# Patient Record
Sex: FEMALE | Race: WHITE | Employment: FULL TIME | URBAN - METROPOLITAN AREA
[De-identification: names, ages, dates, MRNs, and addresses within clinical notes are randomized per-mention and may not be internally consistent; named-entity substitution may affect disease eponyms.]

---

## 2019-04-29 PROBLEM — S42.022D: Status: ACTIVE | Noted: 2019-04-29

## 2019-06-13 PROBLEM — M85.89 OSTEOPENIA OF MULTIPLE SITES: Status: ACTIVE | Noted: 2017-03-22

## 2019-07-09 PROCEDURE — 88377 M/PHMTRC ALYS ISHQUANT/SEMIQ: CPT | Performed by: RADIOLOGY

## 2019-07-09 PROCEDURE — 88360 TUMOR IMMUNOHISTOCHEM/MANUAL: CPT | Performed by: RADIOLOGY

## 2019-07-09 PROCEDURE — 88305 TISSUE EXAM BY PATHOLOGIST: CPT | Performed by: RADIOLOGY

## 2019-07-15 PROBLEM — Z17.0 MALIGNANT NEOPLASM OF CENTRAL PORTION OF RIGHT BREAST IN FEMALE, ESTROGEN RECEPTOR POSITIVE (HCC): Status: ACTIVE | Noted: 2019-07-15

## 2019-07-15 PROBLEM — C50.111 MALIGNANT NEOPLASM OF CENTRAL PORTION OF RIGHT BREAST IN FEMALE, ESTROGEN RECEPTOR POSITIVE (HCC): Status: ACTIVE | Noted: 2019-07-15

## 2019-07-15 PROBLEM — Z17.0 MALIGNANT NEOPLASM OF CENTRAL PORTION OF RIGHT BREAST IN FEMALE, ESTROGEN RECEPTOR POSITIVE: Status: ACTIVE | Noted: 2019-07-15

## 2019-07-15 PROBLEM — C50.111 MALIGNANT NEOPLASM OF CENTRAL PORTION OF RIGHT BREAST IN FEMALE, ESTROGEN RECEPTOR POSITIVE: Status: ACTIVE | Noted: 2019-07-15

## 2019-07-31 ENCOUNTER — OFFICE VISIT (OUTPATIENT)
Dept: SURGERY | Facility: CLINIC | Age: 64
End: 2019-07-31
Payer: COMMERCIAL

## 2019-07-31 VITALS — BODY MASS INDEX: 22.91 KG/M2 | HEIGHT: 67 IN | WEIGHT: 146 LBS

## 2019-07-31 DIAGNOSIS — Z17.0 MALIGNANT NEOPLASM OF CENTRAL PORTION OF RIGHT BREAST IN FEMALE, ESTROGEN RECEPTOR POSITIVE (HCC): ICD-10-CM

## 2019-07-31 DIAGNOSIS — C50.111 MALIGNANT NEOPLASM OF CENTRAL PORTION OF RIGHT BREAST IN FEMALE, ESTROGEN RECEPTOR POSITIVE (HCC): ICD-10-CM

## 2019-07-31 DIAGNOSIS — Z01.818 PREOP EXAMINATION: Primary | ICD-10-CM

## 2019-07-31 PROCEDURE — 99243 OFF/OP CNSLTJ NEW/EST LOW 30: CPT | Performed by: SURGERY

## 2019-07-31 RX ORDER — BIOTIN 1 MG
1 TABLET ORAL DAILY
COMMUNITY

## 2019-07-31 NOTE — PROGRESS NOTES
Surgeon: Dr. Crystal Winters.  Marcello Martinez, PhD     Tel:  [phone]    Fax: [phone]     Surgery/Procedure: Immediate right breast reconstruction with tissue expander placement and acellular dermal matrix     1.5 hours, General anesthesia, observation stay       Ho

## 2019-07-31 NOTE — CONSULTS
New Patient Consultation    Chief Complaint:  Patient presents with:  Consult: Breast Cancer, Breast Reconstruction.  Dr. Rosalia Baires Referring      History of Present Illness:   Abram Jiménez is a 62year old female referred by Dr. Rosalia Baires to discuss Katelyn Mccoy Stroke Mother    • Cancer Brother         NMSC   • Heart Disorder Brother    • Cancer Other         breast, aunt   • Breast Cancer Maternal Aunt 61        age 62s   • Heart Disorder Sister    • Heart Disorder Brother    • Eye Problems Neg          Social H with swallowing, reflux symptoms, nausea, vomiting, dark/ bloody stools, diarrhea, constipation,  change in bowel habits, or abdominal pain.    Genitourinary:  The patient denies frequent urination, needing to get up at night to urinate, urinary hesitancy o nipple retraction, lumpectomy scar  Base Diameter 13cm. Respiratory: Normal respiratory effort. Cardiovascular: no cyanosis, no edema    Lymphatic:  There is no cervical, supraclavicular, or axillary lymphadenopathy appreciated.     Musculoskeletal: but not limited to bleeding, infection, scarring, seroma, delayed wound healing, partial/total flap loss, fat necrosis, asymmetry, injury to adjacent structures, abdominal hernia/weakness/bulge, need for further surgery, and venous thromboembolism  reviewe

## 2019-07-31 NOTE — PROGRESS NOTES
Pt given pre-op instructions for surgery. Surgery to be coordinated with Dr. David Sy. Surgical scrub wash and instructions provided. PT instructed to see PCP for medical clearance prior to surgery, along with obtain labs and EKG. Pt agreed and understood.

## 2019-08-06 ENCOUNTER — TELEPHONE (OUTPATIENT)
Dept: SURGERY | Facility: CLINIC | Age: 64
End: 2019-08-06

## 2019-08-06 NOTE — TELEPHONE ENCOUNTER
Patient calling to schedule surgery, joint case with Dr. Gracy Ventura-  Message routed to our surgery scheduler to contact Agus Carlton at Dr. Eliza Villar office.

## 2019-08-12 ENCOUNTER — TELEPHONE (OUTPATIENT)
Dept: SURGERY | Facility: CLINIC | Age: 64
End: 2019-08-12

## 2019-08-12 NOTE — TELEPHONE ENCOUNTER
Patient calling to see if we can provide an earlier for surgery with Khurram Davis/Darcie. Patient mentioned 8/19. Informed her that Dr Sarah Bolanos is in a different hospital on that day so this will not work. Will discuss some options with Bridget and call back.  Rosina

## 2019-08-15 DIAGNOSIS — Z17.0 MALIGNANT NEOPLASM OF CENTRAL PORTION OF RIGHT BREAST IN FEMALE, ESTROGEN RECEPTOR POSITIVE (HCC): Primary | ICD-10-CM

## 2019-08-15 DIAGNOSIS — C50.111 MALIGNANT NEOPLASM OF CENTRAL PORTION OF RIGHT BREAST IN FEMALE, ESTROGEN RECEPTOR POSITIVE (HCC): Primary | ICD-10-CM

## 2019-08-16 ENCOUNTER — TELEPHONE (OUTPATIENT)
Dept: SURGERY | Facility: CLINIC | Age: 64
End: 2019-08-16

## 2019-08-16 NOTE — TELEPHONE ENCOUNTER
Faxed EPIC request for medical clearance to Dr. Sydney Schmitt office, fax confirmation page received. I will anticipate the clearance.

## 2019-08-26 ENCOUNTER — TELEPHONE (OUTPATIENT)
Dept: CT IMAGING | Facility: HOSPITAL | Age: 64
End: 2019-08-26

## 2019-08-26 NOTE — TELEPHONE ENCOUNTER
Spoke with Nam Welsh regarding [place] Lymph Node mapping which will be done in nuclear medicine department before Right mastectomy surgery scheduled for 9/3/19 with Dr Yu Melgar. Procedure explained and all questions answered.  Verbal edu

## 2019-09-03 ENCOUNTER — ANESTHESIA (OUTPATIENT)
Dept: SURGERY | Facility: HOSPITAL | Age: 64
End: 2019-09-03
Payer: COMMERCIAL

## 2019-09-03 ENCOUNTER — HOSPITAL ENCOUNTER (OUTPATIENT)
Facility: HOSPITAL | Age: 64
Discharge: HOME | End: 2019-09-04
Attending: SURGERY | Admitting: SURGERY
Payer: COMMERCIAL

## 2019-09-03 ENCOUNTER — HOSPITAL ENCOUNTER (OUTPATIENT)
Dept: NUCLEAR MEDICINE | Facility: HOSPITAL | Age: 64
Discharge: HOME | End: 2019-09-03
Attending: SURGERY
Payer: COMMERCIAL

## 2019-09-03 ENCOUNTER — ANESTHESIA EVENT (OUTPATIENT)
Dept: SURGERY | Facility: HOSPITAL | Age: 64
End: 2019-09-03
Payer: COMMERCIAL

## 2019-09-03 DIAGNOSIS — C50.911 MALIGNANT NEOPLASM OF RIGHT FEMALE BREAST, UNSPECIFIED ESTROGEN RECEPTOR STATUS, UNSPECIFIED SITE OF BREAST (HCC): ICD-10-CM

## 2019-09-03 PROCEDURE — 0HTT0ZZ RESECTION OF RIGHT BREAST, OPEN APPROACH: ICD-10-PCS | Performed by: SURGERY

## 2019-09-03 PROCEDURE — 3E0W3KZ INTRODUCTION OF OTHER DIAGNOSTIC SUBSTANCE INTO LYMPHATICS, PERCUTANEOUS APPROACH: ICD-10-PCS | Performed by: SURGERY

## 2019-09-03 PROCEDURE — 88307 TISSUE EXAM BY PATHOLOGIST: CPT | Performed by: SURGERY

## 2019-09-03 PROCEDURE — 88305 TISSUE EXAM BY PATHOLOGIST: CPT | Performed by: SURGERY

## 2019-09-03 PROCEDURE — 78195 LYMPH SYSTEM IMAGING: CPT | Performed by: SURGERY

## 2019-09-03 PROCEDURE — 88331 PATH CONSLTJ SURG 1 BLK 1SPC: CPT | Performed by: SURGERY

## 2019-09-03 PROCEDURE — 07B50ZX EXCISION OF RIGHT AXILLARY LYMPHATIC, OPEN APPROACH, DIAGNOSTIC: ICD-10-PCS | Performed by: SURGERY

## 2019-09-03 PROCEDURE — 0HHT0NZ INSERTION OF TISSUE EXPANDER INTO RIGHT BREAST, OPEN APPROACH: ICD-10-PCS | Performed by: SURGERY

## 2019-09-03 DEVICE — GRAFT ALLODERM CONTOUR MED PRF: Type: IMPLANTABLE DEVICE | Site: BREAST | Status: FUNCTIONAL

## 2019-09-03 RX ORDER — HYDROCODONE BITARTRATE AND ACETAMINOPHEN 5; 325 MG/1; MG/1
1 TABLET ORAL AS NEEDED
Status: DISCONTINUED | OUTPATIENT
Start: 2019-09-03 | End: 2019-09-03 | Stop reason: HOSPADM

## 2019-09-03 RX ORDER — MORPHINE SULFATE 4 MG/ML
8 INJECTION, SOLUTION INTRAMUSCULAR; INTRAVENOUS
Status: DISCONTINUED | OUTPATIENT
Start: 2019-09-03 | End: 2019-09-04

## 2019-09-03 RX ORDER — MEPERIDINE HYDROCHLORIDE 25 MG/ML
12.5 INJECTION INTRAMUSCULAR; INTRAVENOUS; SUBCUTANEOUS AS NEEDED
Status: DISCONTINUED | OUTPATIENT
Start: 2019-09-03 | End: 2019-09-03 | Stop reason: HOSPADM

## 2019-09-03 RX ORDER — METOCLOPRAMIDE HYDROCHLORIDE 5 MG/ML
10 INJECTION INTRAMUSCULAR; INTRAVENOUS EVERY 6 HOURS PRN
Status: DISCONTINUED | OUTPATIENT
Start: 2019-09-03 | End: 2019-09-04

## 2019-09-03 RX ORDER — SODIUM CHLORIDE, SODIUM LACTATE, POTASSIUM CHLORIDE, CALCIUM CHLORIDE 600; 310; 30; 20 MG/100ML; MG/100ML; MG/100ML; MG/100ML
INJECTION, SOLUTION INTRAVENOUS CONTINUOUS
Status: DISCONTINUED | OUTPATIENT
Start: 2019-09-03 | End: 2019-09-04

## 2019-09-03 RX ORDER — CEFAZOLIN SODIUM/WATER 2 G/20 ML
2 SYRINGE (ML) INTRAVENOUS EVERY 8 HOURS
Status: DISCONTINUED | OUTPATIENT
Start: 2019-09-03 | End: 2019-09-04

## 2019-09-03 RX ORDER — MORPHINE SULFATE 4 MG/ML
4 INJECTION, SOLUTION INTRAMUSCULAR; INTRAVENOUS
Status: DISCONTINUED | OUTPATIENT
Start: 2019-09-03 | End: 2019-09-04

## 2019-09-03 RX ORDER — HYDROMORPHONE HYDROCHLORIDE 1 MG/ML
0.4 INJECTION, SOLUTION INTRAMUSCULAR; INTRAVENOUS; SUBCUTANEOUS EVERY 5 MIN PRN
Status: DISCONTINUED | OUTPATIENT
Start: 2019-09-03 | End: 2019-09-03 | Stop reason: HOSPADM

## 2019-09-03 RX ORDER — HYDROCODONE BITARTRATE AND ACETAMINOPHEN 5; 325 MG/1; MG/1
1-2 TABLET ORAL EVERY 4 HOURS PRN
Qty: 40 TABLET | Refills: 0 | Status: SHIPPED | OUTPATIENT
Start: 2019-09-03 | End: 2019-09-25

## 2019-09-03 RX ORDER — ONDANSETRON 4 MG/1
4 TABLET, ORALLY DISINTEGRATING ORAL EVERY 6 HOURS PRN
Status: DISCONTINUED | OUTPATIENT
Start: 2019-09-03 | End: 2019-09-04

## 2019-09-03 RX ORDER — LIDOCAINE AND PRILOCAINE 25; 25 MG/G; MG/G
CREAM TOPICAL ONCE
Status: COMPLETED | OUTPATIENT
Start: 2019-09-03 | End: 2019-09-03

## 2019-09-03 RX ORDER — MORPHINE SULFATE 4 MG/ML
2 INJECTION, SOLUTION INTRAMUSCULAR; INTRAVENOUS
Status: DISCONTINUED | OUTPATIENT
Start: 2019-09-03 | End: 2019-09-04

## 2019-09-03 RX ORDER — DOCUSATE SODIUM 100 MG/1
100 CAPSULE, LIQUID FILLED ORAL 2 TIMES DAILY
Qty: 40 CAPSULE | Refills: 0 | Status: SHIPPED | OUTPATIENT
Start: 2019-09-03 | End: 2019-09-25

## 2019-09-03 RX ORDER — METOCLOPRAMIDE 10 MG/1
10 TABLET ORAL EVERY 6 HOURS PRN
Status: DISCONTINUED | OUTPATIENT
Start: 2019-09-03 | End: 2019-09-04

## 2019-09-03 RX ORDER — CEFAZOLIN SODIUM/WATER 2 G/20 ML
2 SYRINGE (ML) INTRAVENOUS ONCE
Status: COMPLETED | OUTPATIENT
Start: 2019-09-03 | End: 2019-09-03

## 2019-09-03 RX ORDER — ONDANSETRON 2 MG/ML
4 INJECTION INTRAMUSCULAR; INTRAVENOUS EVERY 6 HOURS PRN
Status: DISCONTINUED | OUTPATIENT
Start: 2019-09-03 | End: 2019-09-04

## 2019-09-03 RX ORDER — ACETAMINOPHEN 500 MG
1000 TABLET ORAL EVERY 6 HOURS PRN
COMMUNITY
End: 2020-02-17 | Stop reason: ALTCHOICE

## 2019-09-03 RX ORDER — ISOSULFAN BLUE 50 MG/5ML
INJECTION, SOLUTION SUBCUTANEOUS AS NEEDED
Status: DISCONTINUED | OUTPATIENT
Start: 2019-09-03 | End: 2019-09-03 | Stop reason: HOSPADM

## 2019-09-03 RX ORDER — DIPHENHYDRAMINE HCL 25 MG
25 CAPSULE ORAL EVERY 4 HOURS PRN
Status: DISCONTINUED | OUTPATIENT
Start: 2019-09-03 | End: 2019-09-04

## 2019-09-03 RX ORDER — ACETAMINOPHEN 500 MG
1000 TABLET ORAL EVERY 6 HOURS PRN
Status: DISCONTINUED | OUTPATIENT
Start: 2019-09-03 | End: 2019-09-04

## 2019-09-03 RX ORDER — HYDROMORPHONE HYDROCHLORIDE 1 MG/ML
INJECTION, SOLUTION INTRAMUSCULAR; INTRAVENOUS; SUBCUTANEOUS
Status: COMPLETED
Start: 2019-09-03 | End: 2019-09-03

## 2019-09-03 RX ORDER — DIAZEPAM 5 MG/1
5 TABLET ORAL AS NEEDED
Status: DISCONTINUED | OUTPATIENT
Start: 2019-09-03 | End: 2019-09-03 | Stop reason: HOSPADM

## 2019-09-03 RX ORDER — ACETAMINOPHEN 500 MG
500 TABLET ORAL EVERY 6 HOURS PRN
Status: DISCONTINUED | OUTPATIENT
Start: 2019-09-03 | End: 2019-09-04

## 2019-09-03 RX ORDER — HYDROCODONE BITARTRATE AND ACETAMINOPHEN 5; 325 MG/1; MG/1
1-2 TABLET ORAL EVERY 6 HOURS PRN
Status: DISCONTINUED | OUTPATIENT
Start: 2019-09-03 | End: 2019-09-04

## 2019-09-03 RX ORDER — ONDANSETRON 4 MG/1
4 TABLET, FILM COATED ORAL EVERY 8 HOURS PRN
Qty: 20 TABLET | Refills: 1 | Status: SHIPPED | OUTPATIENT
Start: 2019-09-03 | End: 2019-09-25

## 2019-09-03 RX ORDER — DIPHENHYDRAMINE HYDROCHLORIDE 50 MG/ML
12.5 INJECTION INTRAMUSCULAR; INTRAVENOUS EVERY 4 HOURS PRN
Status: DISCONTINUED | OUTPATIENT
Start: 2019-09-03 | End: 2019-09-04

## 2019-09-03 RX ORDER — ACETAMINOPHEN 500 MG
1000 TABLET ORAL ONCE
Status: DISCONTINUED | OUTPATIENT
Start: 2019-09-03 | End: 2019-09-03 | Stop reason: HOSPADM

## 2019-09-03 RX ORDER — HYDROCODONE BITARTRATE AND ACETAMINOPHEN 5; 325 MG/1; MG/1
2 TABLET ORAL AS NEEDED
Status: DISCONTINUED | OUTPATIENT
Start: 2019-09-03 | End: 2019-09-03 | Stop reason: HOSPADM

## 2019-09-03 RX ORDER — LABETALOL HYDROCHLORIDE 5 MG/ML
5 INJECTION, SOLUTION INTRAVENOUS EVERY 5 MIN PRN
Status: DISCONTINUED | OUTPATIENT
Start: 2019-09-03 | End: 2019-09-03 | Stop reason: HOSPADM

## 2019-09-03 RX ORDER — SODIUM CHLORIDE, SODIUM LACTATE, POTASSIUM CHLORIDE, CALCIUM CHLORIDE 600; 310; 30; 20 MG/100ML; MG/100ML; MG/100ML; MG/100ML
INJECTION, SOLUTION INTRAVENOUS CONTINUOUS
Status: DISCONTINUED | OUTPATIENT
Start: 2019-09-03 | End: 2019-09-03 | Stop reason: HOSPADM

## 2019-09-03 RX ORDER — ENOXAPARIN SODIUM 100 MG/ML
40 INJECTION SUBCUTANEOUS DAILY
Status: DISCONTINUED | OUTPATIENT
Start: 2019-09-04 | End: 2019-09-04

## 2019-09-03 RX ORDER — ONDANSETRON 2 MG/ML
4 INJECTION INTRAMUSCULAR; INTRAVENOUS AS NEEDED
Status: DISCONTINUED | OUTPATIENT
Start: 2019-09-03 | End: 2019-09-03 | Stop reason: HOSPADM

## 2019-09-03 RX ORDER — DOCUSATE SODIUM 100 MG/1
100 CAPSULE, LIQUID FILLED ORAL 2 TIMES DAILY
Status: DISCONTINUED | OUTPATIENT
Start: 2019-09-03 | End: 2019-09-04

## 2019-09-03 RX ORDER — CEPHALEXIN 500 MG/1
500 CAPSULE ORAL 4 TIMES DAILY
Qty: 40 CAPSULE | Refills: 1 | Status: SHIPPED | OUTPATIENT
Start: 2019-09-03 | End: 2019-09-05

## 2019-09-03 RX ORDER — NALOXONE HYDROCHLORIDE 0.4 MG/ML
80 INJECTION, SOLUTION INTRAMUSCULAR; INTRAVENOUS; SUBCUTANEOUS AS NEEDED
Status: DISCONTINUED | OUTPATIENT
Start: 2019-09-03 | End: 2019-09-03 | Stop reason: HOSPADM

## 2019-09-03 NOTE — BRIEF OP NOTE
Pre-Operative Diagnosis: Malignant neoplasm of right female breast, unspecified estrogen receptor status, unspecified site of breast [facility] [C50.911]     Post-Operative Diagnosis: Malignant neoplasm of right female breast, unspecified estrogen receptor stat

## 2019-09-03 NOTE — PLAN OF CARE
Problem: Patient/Family Goals  Goal: Patient/Family Short Term Goal  Description  Patient's Short Term Goal: HAVE TOLERABLE PAIN  Interventions:   - GIVE PAIN MEDS AS NEEDED  - See additional Care Plan goals for specific interventions   Outcome: Progress Provide nonpharmacologic comfort measures as appropriate  - Advance diet as tolerated, if ordered  - Obtain nutritional consult as needed  - Evaluate fluid balance  Outcome: Progressing     Problem: Patient/Family Goals  Goal: Patient/Family Long Term Goal

## 2019-09-03 NOTE — BRIEF OP NOTE
Pre-Operative Diagnosis: Malignant neoplasm of right female breast, unspecified estrogen receptor status, unspecified site of breast [facility] [C50.911]     Post-Operative Diagnosis: Malignant neoplasm of right female breast, unspecified estrogen receptor stat

## 2019-09-03 NOTE — H&P
[address] Patient Status:  Outpatient in a Bed    3/4/1955 MRN [id number]   Location [facility] Attending Vince Quezada MD   Hosp Day # 0 PCP Jose L Gaitan MD     Active Problems:    * No active hospital to achieve pregnancy. She does have a family history of reproductive malignancies.    There is no family history of ovarian cancer  Maternal aunt had breast cancer in her 62s     She had MRI and right breast US guided bx     Allergies:     Dexamethasone right axilla  Right breast at the lateral aspect of the incision - there is a firm 1 cm nodule     RADIOLOGIC WORK-UP: The radiology films and reports were reviewed and discussed with the patient.      BILATERAL MAMMOGRAPHY:   [address] breast surgery  has been notified of the results recommendations and breast surgery will notify the patient.   PATHOLOGY ADDENDUM ENDS   Addended by Paul Olson MD on 7/11/2019 10:31 AM      Study Result      DATE OF SERVICE: [phone]     PROCEDURE lesion  detection/characterization, pharmacokinetic analysis, with further physician review for  interpretation of breast MRI.   COMPARISON: Prior mammograms dating back to August 11, 2017; right breast ultrasound dated June 26, 2019 and breast MRI dated J mass, 10:00, 2 cm from nipple, ultrasound-guided 12-gauge Bard Marquee needle core biopsies:  -Invasive ductal carcinoma.        -Grade 3 (Tubules: 3, Nuclei: 3, Mitoses: 3).         -Largest focus of tumor measures 12 mm (1.2 cm); present in 95% of submitt

## 2019-09-03 NOTE — ANESTHESIA PREPROCEDURE EVALUATION
PRE-OP EVALUATION    Patient Name: Diamond Meigs    Pre-op Diagnosis: Malignant neoplasm of right female breast, unspecified estrogen receptor status, unspecified site of [facility] [C50.911]    Procedure(s):  RIGHT BREAST TOTAL MASTECTOMY WITH RIGHT Endo/Other    Negative endo/other ROS. Pulmonary    Negative pulmonary ROS. Neuro/Psych    Negative neuro/psych ROS.                                 Past Surgical History:   Procedure Lateralit and patient meets guidelines. Post-procedure pain management plan discussed with surgeon and patient.       Plan/risks discussed with: patient                Present on Admission:  **None**

## 2019-09-03 NOTE — OPERATIVE REPORT
Pre-Operative Diagnosis: Malignant neoplasm of right female breast, unspecified estrogen receptor status, unspecified site [facility] [C50.911]     Post-Operative Diagnosis: Malignant neoplasm of right female breast, unspecified estrogen receptor stat

## 2019-09-03 NOTE — ANESTHESIA POSTPROCEDURE EVALUATION
[address] Patient Status:  Outpatient in a Bed   Age/Gender 62year old female MRN [id number]   [facility] SURGERY Attending Kacy Khan MD   Hosp Day # 0 PCP Karyle Door, MD       Anesthesia Post-op Note    P

## 2019-09-04 VITALS
DIASTOLIC BLOOD PRESSURE: 56 MMHG | HEIGHT: 67 IN | BODY MASS INDEX: 22.59 KG/M2 | OXYGEN SATURATION: 100 % | HEART RATE: 76 BPM | RESPIRATION RATE: 18 BRPM | SYSTOLIC BLOOD PRESSURE: 117 MMHG | TEMPERATURE: 99 F | WEIGHT: 143.94 LBS

## 2019-09-04 NOTE — PLAN OF CARE
NURSING DISCHARGE NOTE    Discharged Home via Wheelchair. Accompanied by Support staff  Belongings Taken by patient/family. Pt given/explained d/c instructions with daughter present. Verbalized understanding.  RX given for Norco, Keflex, Colace and

## 2019-09-04 NOTE — PLAN OF CARE
Problem: Patient/Family Goals  Goal: Patient/Family Long Term Goal  Description  Patient's Long Term Goal: GO HOME  Interventions:  - VS Q 4 HRS  -ADVANCE DIET AS TOLERATED  -IV FLUIDS  -- See additional Care Plan goals for specific interventions   Outco nausea and vomiting  Description  INTERVENTIONS:  - Maintain adequate hydration with IV or PO as ordered and tolerated  - Nasogastric tube to low intermittent suction as ordered  - Evaluate effectiveness of ordered antiemetic medications  - Provide nonphar

## 2019-09-04 NOTE — PROGRESS NOTES
PLASTICS    This is a 62year old female that is POD#1 s/p her right mastectomy and right SLNB (Darcie) with immediate reconstruction with subpectoral tissue expander, ADM, no intra-op fill by Dr. Veronica Obando. She had some nausea overnight that is now resolved.

## 2019-09-04 NOTE — PLAN OF CARE
Upon assessment, VSS, afebrile. Pt rates pain to right breast as \"mild\" and only requesting Tylenol ES. Pt declines wanting to take Norco. Pt tolerated breakfast without nausea. Right breast drsg with fluff and surgical bra, C/D/I.  Incision with steristr increased pain with activity and pre-medicate as appropriate  Outcome: Adequate for Discharge     Problem: SAFETY ADULT - FALL  Goal: Free from fall injury  Description  INTERVENTIONS:  - Assess pt frequently for physical needs  - Identify cognitive and ph integrity  - Assess and document dressing/incision, wound bed, drain sites and surrounding tissue  - Implement wound care per orders  - Initiate isolation precautions as appropriate  - Initiate Pressure Ulcer prevention bundle as indicated  Outcome: Jose Manuel Dunlap

## 2019-09-04 NOTE — PROGRESS NOTES
ASSISTED TO BATHROOM. VOIDED BLUE TINGED URINE (FROM DYE USED IN OR). MORE AWAKE . WITH TOLERABLE PAIN.

## 2019-09-04 NOTE — OPERATIVE REPORT
[facility]    PATIENT'S NAME: Dharmesh Valle   ATTENDING PHYSICIAN: Carlin Esparza M.D. OPERATING PHYSICIAN: Agnes Obando MD   PATIENT ACCOUNT#:   [id number]    LOCATION:  [address] [place] [address]  MEDICAL RECORD #:   [id number]       DATE OF BIRTH:  [birth year] Then, her procedure was started with Dr. Jose Luis silver with the right mastectomy and sentinel lymph node biopsy.   After completion of Dr. Jose Luis silver, I was called into the room, and at that time, the mastectomy had been completed and a significant porti

## 2019-09-04 NOTE — OPERATIVE REPORT
[facility]    PATIENT'S NAME: Ivan Dakins   ATTENDING PHYSICIAN: Ethan Gutierrez M.D. OPERATING PHYSICIAN: Ethan Gutierrez M.D.    PATIENT ACCOUNT#:   [de-identified]    LOCATION:  [address] [place] [address]  MEDICAL RECORD #:   [id number]       DATE OF BIRTH:  [birth year] to Pathology for frozen section analysis. I examined the axilla for any further hot palpable blue sentinel lymph nodes, and there were none noted.   I turned my attention towards the right breast.  I made an elliptical incision encompassing the entire comp

## 2019-09-05 ENCOUNTER — TELEPHONE (OUTPATIENT)
Dept: SURGERY | Facility: CLINIC | Age: 64
End: 2019-09-05

## 2019-09-05 DIAGNOSIS — C50.111 MALIGNANT NEOPLASM OF CENTRAL PORTION OF RIGHT BREAST IN FEMALE, ESTROGEN RECEPTOR POSITIVE (HCC): Primary | ICD-10-CM

## 2019-09-05 DIAGNOSIS — Z17.0 MALIGNANT NEOPLASM OF CENTRAL PORTION OF RIGHT BREAST IN FEMALE, ESTROGEN RECEPTOR POSITIVE (HCC): Primary | ICD-10-CM

## 2019-09-05 RX ORDER — CLINDAMYCIN HYDROCHLORIDE 300 MG/1
300 CAPSULE ORAL 3 TIMES DAILY
Qty: 21 CAPSULE | Refills: 0 | Status: SHIPPED | OUTPATIENT
Start: 2019-09-05 | End: 2019-09-09

## 2019-09-05 RX ORDER — LEVOFLOXACIN 500 MG/1
500 TABLET, FILM COATED ORAL DAILY
Qty: 7 TABLET | Refills: 0 | Status: SHIPPED | OUTPATIENT
Start: 2019-09-05 | End: 2019-09-12

## 2019-09-05 NOTE — TELEPHONE ENCOUNTER
Pt's daughter phoned in to let us know her mother had face flushing and rash after taking the first dose of Keflex last night. It is gone now, and she has not taken any more of the abx. Confirmed her pharmacy, and will call her back with alternate ABX.

## 2019-09-05 NOTE — TELEPHONE ENCOUNTER
I notified patient's daughter Meeta Durham to let her know we would be ordering Levaquin 500 mg to take once per day to her preferred pharmacy. Advised her to call us with any further concerns/problems.

## 2019-09-05 NOTE — TELEPHONE ENCOUNTER
Confirmed with patient that she does NOT have an allergy to any other ABX, not clindamycin. Reviewed her entire allergy list, and added Keflex due to the rash, and flushing she experienced after taking.    I reviewed Clindamycin is taken 3 times per day,

## 2019-09-05 NOTE — TELEPHONE ENCOUNTER
Pt phoned in to see if she can have an alternative abx, instead of levaquin. She is nervous about all the warnings that were reviewed by pharmacist when she picked it up. She is very sensitive to medicines.    I reviewed her allergy list, and let her know

## 2019-09-06 ENCOUNTER — TELEPHONE (OUTPATIENT)
Dept: SURGERY | Facility: CLINIC | Age: 64
End: 2019-09-06

## 2019-09-06 NOTE — TELEPHONE ENCOUNTER
Patient called this afternoon and LVM stating that with in the last hour she notice her breast has some redness. Patient was called back, patient state that her breast is red and has some swelling and bruising.  Patient denies any fever or warm to the touch

## 2019-09-06 NOTE — TELEPHONE ENCOUNTER
Patient called asking if she is to do anything with her drain site dressing. Patient was instructed that she may take a shower and then change the dressing after her shower. She was told to place the bio patch first then the Tegaderm.  I asked patient how s

## 2019-09-09 ENCOUNTER — OFFICE VISIT (OUTPATIENT)
Dept: SURGERY | Facility: CLINIC | Age: 64
End: 2019-09-09

## 2019-09-09 DIAGNOSIS — C50.111 MALIGNANT NEOPLASM OF CENTRAL PORTION OF RIGHT BREAST IN FEMALE, ESTROGEN RECEPTOR POSITIVE: ICD-10-CM

## 2019-09-09 DIAGNOSIS — Z90.11 ABSENCE OF RIGHT BREAST: Primary | ICD-10-CM

## 2019-09-09 DIAGNOSIS — Z17.0 MALIGNANT NEOPLASM OF CENTRAL PORTION OF RIGHT BREAST IN FEMALE, ESTROGEN RECEPTOR POSITIVE: ICD-10-CM

## 2019-09-09 PROCEDURE — 99024 POSTOP FOLLOW-UP VISIT: CPT | Performed by: PHYSICIAN ASSISTANT

## 2019-09-09 NOTE — PROGRESS NOTES
This is a 62year old female that is POD#6 s/p her right mastectomy and right SLNB (Darcie) with immediate reconstruction with subpectoral tissue expander, ADM, no intra-op fill by Dr. Cesar Victor. Denies nausea or vomiting. Denies chest pain, calf pain, SOB. weekend.

## 2019-09-10 RX ORDER — CLINDAMYCIN HYDROCHLORIDE 300 MG/1
CAPSULE ORAL
Qty: 21 CAPSULE | Refills: 0 | Status: SHIPPED | OUTPATIENT
Start: 2019-09-10 | End: 2019-09-25

## 2019-09-13 ENCOUNTER — OFFICE VISIT (OUTPATIENT)
Dept: SURGERY | Facility: CLINIC | Age: 64
End: 2019-09-13

## 2019-09-13 DIAGNOSIS — Z90.11 ABSENCE OF RIGHT BREAST: Primary | ICD-10-CM

## 2019-09-13 PROCEDURE — 99024 POSTOP FOLLOW-UP VISIT: CPT | Performed by: PHYSICIAN ASSISTANT

## 2019-09-13 NOTE — PROGRESS NOTES
This is a 62year old female that is POD#10 s/p her right mastectomy and right SLNB (Darcie) with immediate reconstruction with subpectoral tissue expander, ADM, no intra-op fill by Dr. Vaishali Sevilla. Denies nausea or vomiting. Denies chest pain, calf pain, SOB.

## 2019-09-16 PROBLEM — M81.8 OTHER OSTEOPOROSIS WITHOUT CURRENT PATHOLOGICAL FRACTURE: Status: ACTIVE | Noted: 2019-09-16

## 2019-09-25 ENCOUNTER — OFFICE VISIT (OUTPATIENT)
Dept: SURGERY | Facility: CLINIC | Age: 64
End: 2019-09-25

## 2019-09-25 DIAGNOSIS — Z90.11 ABSENCE OF RIGHT BREAST: Primary | ICD-10-CM

## 2019-09-25 PROCEDURE — 99024 POSTOP FOLLOW-UP VISIT: CPT | Performed by: SURGERY

## 2019-09-25 NOTE — PROGRESS NOTES
Tevin Okeefe is a 62year old female who presents today for a follow-up after R SSM and subperoral TE placement no intra OP fill        She denies fever and chills. She denies nausea, vomiting, diarrhea or constipation. Her pain is controlled.

## 2019-10-07 ENCOUNTER — OFFICE VISIT (OUTPATIENT)
Dept: SURGERY | Facility: CLINIC | Age: 64
End: 2019-10-07

## 2019-10-07 DIAGNOSIS — Z90.11 ABSENCE OF RIGHT BREAST: Primary | ICD-10-CM

## 2019-10-07 PROCEDURE — 99024 POSTOP FOLLOW-UP VISIT: CPT | Performed by: PHYSICIAN ASSISTANT

## 2019-10-07 NOTE — PROGRESS NOTES
This is a 62year old female that is 3.5 weeks s/p her right mastectomy and right SLNB (Darcie) with immediate reconstruction with subpectoral tissue expander, ADM, no intra-op fill by Dr. Yasmin Melgar.  Denies nausea or vomiting.  Denies chest pain, calf pain, S

## 2019-10-14 ENCOUNTER — TELEPHONE (OUTPATIENT)
Dept: SURGERY | Facility: CLINIC | Age: 64
End: 2019-10-14

## 2019-10-14 ENCOUNTER — OFFICE VISIT (OUTPATIENT)
Dept: SURGERY | Facility: CLINIC | Age: 64
End: 2019-10-14

## 2019-10-14 DIAGNOSIS — Z90.11 ABSENCE OF RIGHT BREAST: Primary | ICD-10-CM

## 2019-10-14 PROCEDURE — 99024 POSTOP FOLLOW-UP VISIT: CPT | Performed by: PHYSICIAN ASSISTANT

## 2019-10-14 NOTE — TELEPHONE ENCOUNTER
Patient called and LVM asking when can she be cleared to swim. Per Jeffrey pt is to wit 6-8 weeks and be cleared by surgery. Patient was called back and notified.  Pt has appointment on next Wednesday and she will speak to Dr. Cornelius Gan as it will be 6 weeks then

## 2019-10-14 NOTE — PROGRESS NOTES
This is a 62year old female that is 4.5 weeks s/p her right mastectomy and right SLNB (Darcie) with immediate reconstruction with subpectoral tissue expander, ADM, no intra-op fill by Dr. Josue Romero.  Denies nausea or vomiting.  Denies chest pain, calf pain, S

## 2019-10-23 ENCOUNTER — OFFICE VISIT (OUTPATIENT)
Dept: SURGERY | Facility: CLINIC | Age: 64
End: 2019-10-23

## 2019-10-23 DIAGNOSIS — Z90.11 ABSENCE OF RIGHT BREAST: Primary | ICD-10-CM

## 2019-10-23 PROCEDURE — 99024 POSTOP FOLLOW-UP VISIT: CPT | Performed by: SURGERY

## 2019-10-23 NOTE — PROGRESS NOTES
Abram Jiménez is a 62year old female who presents today for a follow-up after R breast TE placement    She denies fever and chills. She denies nausea, vomiting, diarrhea or constipation. Her pain is controlled.         Physical Exam     Surgical inci

## 2019-11-04 ENCOUNTER — OFFICE VISIT (OUTPATIENT)
Dept: SURGERY | Facility: CLINIC | Age: 64
End: 2019-11-04

## 2019-11-04 DIAGNOSIS — Z90.11 ABSENCE OF RIGHT BREAST: Primary | ICD-10-CM

## 2019-11-04 PROCEDURE — 99024 POSTOP FOLLOW-UP VISIT: CPT | Performed by: PHYSICIAN ASSISTANT

## 2019-11-04 NOTE — PROGRESS NOTES
This is a 62year old female that is 7.5 weeks s/p her right mastectomy and right SLNB (Darcie) with immediate reconstruction with subpectoral tissue expander, ADM, no intra-op fill by Dr. Lesa Pierre.  Denies nausea or vomiting.  Denies chest pain, calf pain, SO

## 2019-11-11 ENCOUNTER — OFFICE VISIT (OUTPATIENT)
Dept: SURGERY | Facility: CLINIC | Age: 64
End: 2019-11-11

## 2019-11-11 DIAGNOSIS — Z90.11 ABSENCE OF RIGHT BREAST: Primary | ICD-10-CM

## 2019-11-11 PROCEDURE — 99024 POSTOP FOLLOW-UP VISIT: CPT | Performed by: PHYSICIAN ASSISTANT

## 2019-11-11 NOTE — PROGRESS NOTES
This is a 62year old female that is 8.5 weeks s/p her right mastectomy and right SLNB (Darcie) with immediate reconstruction with subpectoral tissue expander, ADM, no intra-op fill by Dr. Sandhya Heard.  Denies nausea or vomiting.  Denies chest pain, calf pain, SO

## 2019-11-20 ENCOUNTER — OFFICE VISIT (OUTPATIENT)
Dept: SURGERY | Facility: CLINIC | Age: 64
End: 2019-11-20

## 2019-11-20 DIAGNOSIS — Z90.11 ABSENCE OF RIGHT BREAST: Primary | ICD-10-CM

## 2019-11-20 PROCEDURE — 99024 POSTOP FOLLOW-UP VISIT: CPT | Performed by: SURGERY

## 2019-11-20 NOTE — PROGRESS NOTES
This is a 62year old female that is 10 weeks s/p her right mastectomy and right SLNB (Darcie) with immediate reconstruction with subpectoral tissue expander, ADM, no intra-op fill.   Denies nausea or vomiting.  Denies chest pain, calf pain, SOB.  She has b

## 2019-12-02 ENCOUNTER — OFFICE VISIT (OUTPATIENT)
Dept: SURGERY | Facility: CLINIC | Age: 64
End: 2019-12-02

## 2019-12-02 DIAGNOSIS — Z90.11 ABSENCE OF RIGHT BREAST: Primary | ICD-10-CM

## 2019-12-02 PROCEDURE — 99024 POSTOP FOLLOW-UP VISIT: CPT | Performed by: PHYSICIAN ASSISTANT

## 2019-12-02 NOTE — PROGRESS NOTES
This is a 62year old female that is 11.5 weeks s/p her right mastectomy and right SLNB (Darcie) with immediate reconstruction with subpectoral tissue expander, ADM, no intra-op fill.   Denies nausea or vomiting.  Denies chest pain, calf pain, SOB.  She has

## 2019-12-23 ENCOUNTER — OFFICE VISIT (OUTPATIENT)
Dept: SURGERY | Facility: CLINIC | Age: 64
End: 2019-12-23

## 2019-12-23 DIAGNOSIS — Z90.11 ABSENCE OF RIGHT BREAST: Primary | ICD-10-CM

## 2019-12-23 PROCEDURE — 99212 OFFICE O/P EST SF 10 MIN: CPT | Performed by: PHYSICIAN ASSISTANT

## 2019-12-23 NOTE — PROGRESS NOTES
This is a 62year old female that is 14.5 weeks s/p her right mastectomy and right SLNB (Darcie) with immediate reconstruction with subpectoral tissue expander, ADM, no intra-op fill. Denies nausea or vomiting.  Denies chest pain, calf pain, SOB.  She has b

## 2020-01-15 ENCOUNTER — OFFICE VISIT (OUTPATIENT)
Dept: SURGERY | Facility: CLINIC | Age: 65
End: 2020-01-15

## 2020-01-15 DIAGNOSIS — Z90.11 ABSENCE OF RIGHT BREAST: Primary | ICD-10-CM

## 2020-01-15 PROCEDURE — 99212 OFFICE O/P EST SF 10 MIN: CPT | Performed by: PHYSICIAN ASSISTANT

## 2020-01-15 NOTE — PATIENT INSTRUCTIONS
Surgeon:              Dr. Azeem Frost.  Ben Harris, PhD                                          Tel:         [phone]                                  Fax:        [phone]    Surgery/Procedure:  Right breast tissue expander removal with permanent implant manuel

## 2020-01-15 NOTE — PROGRESS NOTES
Surgery and wash instructions verbally reviewed with the patient and written instructions were also provided. The patient understands the need to obtain medical clearance for this procedure and plans to see Dr. Enmanuel Cochran for this.      Informed consent for t

## 2020-01-15 NOTE — PROGRESS NOTES
David Bull is a 62year old female who presents today for a follow-up after her right mastectomy and right sentinel lymph node biopsy (Darcie) with immediate reconstruction with subpectoral tissue expander, ADM, no Intra-Op fill on 9/3/2019.   She is

## 2020-02-12 ENCOUNTER — OFFICE VISIT (OUTPATIENT)
Dept: SURGERY | Facility: CLINIC | Age: 65
End: 2020-02-12

## 2020-02-12 DIAGNOSIS — Z90.11 ABSENCE OF RIGHT BREAST: Primary | ICD-10-CM

## 2020-02-12 PROCEDURE — 99213 OFFICE O/P EST LOW 20 MIN: CPT | Performed by: SURGERY

## 2020-02-12 NOTE — PATIENT INSTRUCTIONS
Surgeon:              Dr. Shaq Lagunas.  Renea Fam, PhD                                          Tel:         [phone]                                  Fax:        [phone]    Surgery/Procedure:           Right tissue expander removal, permanent silicone impl

## 2020-02-12 NOTE — CONSULTS
Estabilshed Patient Consultation    Chief Complaint: absence of R breast  History of Present Illness:   Jyotsna Borja is a 62year old female who returns to the office after R beast cancer mastectomy and TE placement. 420cc R breast TE.    Here for preO while receiving first dose.   Letrozole               SHORTNESS OF BREATH    Comment:SOB, itching  Scopolamine             RASH    Comment:Patch  Citalopram                  Comment:Burning, nausea, headache with one dose  Diphenhydramine Hcl     CONFUSION Plan:  The patient was counseled on the different treatment options. I discussed with the patient the plan for right tissue expander removal capsulotomy and permanent silicone implant placement.   We also discussed for symmetry to do a left breast augme

## 2020-02-12 NOTE — H&P (VIEW-ONLY)
Estabilshed Patient Consultation    Chief Complaint: absence of R breast  History of Present Illness:   Vicente Selby is a 62year old female who returns to the office after R beast cancer mastectomy and TE placement. 420cc R breast TE.    Here for preO while receiving first dose.   Letrozole               SHORTNESS OF BREATH    Comment:SOB, itching  Scopolamine             RASH    Comment:Patch  Citalopram                  Comment:Burning, nausea, headache with one dose  Diphenhydramine Hcl     CONFUSION Plan:  The patient was counseled on the different treatment options. I discussed with the patient the plan for right tissue expander removal capsulotomy and permanent silicone implant placement.   We also discussed for symmetry to do a left breast augme

## 2020-02-14 ENCOUNTER — TELEPHONE (OUTPATIENT)
Dept: SURGERY | Facility: CLINIC | Age: 65
End: 2020-02-14

## 2020-02-14 NOTE — TELEPHONE ENCOUNTER
The patient is calling again to speak with surgery scheduling regarding the possible 2/27/20 date of surgery-   She would like to confirm her date of surgery with Dr. Thom Jason-    This message was routed to the surgery scheduling pool, the plastics communicat

## 2020-02-14 NOTE — TELEPHONE ENCOUNTER
Patient called and said someone from the hospital called her 3 weeks ago stating she was on the surgery schedule for 2/27 and has already signed her consents and started her clearance process.  She states she was told at her recent office visit on 2/12 that

## 2020-02-17 ENCOUNTER — TELEPHONE (OUTPATIENT)
Dept: SURGERY | Facility: CLINIC | Age: 65
End: 2020-02-17

## 2020-02-17 DIAGNOSIS — Z90.11 ABSENCE OF RIGHT BREAST: Primary | ICD-10-CM

## 2020-02-17 NOTE — TELEPHONE ENCOUNTER
S/w pt in regards to surgery. Patient was under the impression that surgery was scheduled on 2/27. Informed her that we do not have her on the schedule. Apologized for the inconvenience. Offered 2/25. Patient accepted.

## 2020-02-21 ENCOUNTER — TELEPHONE (OUTPATIENT)
Dept: SURGERY | Facility: CLINIC | Age: 65
End: 2020-02-21

## 2020-02-21 NOTE — TELEPHONE ENCOUNTER
I called and spoke with the patient to let her know that I have received a copy of her EKG tracing via fax and that it was immediately faxed and scanned to Daniel Swanson-  She verbalized appreciation for the call back.

## 2020-02-24 ENCOUNTER — ANESTHESIA EVENT (OUTPATIENT)
Dept: SURGERY | Facility: HOSPITAL | Age: 65
End: 2020-02-24
Payer: COMMERCIAL

## 2020-02-25 ENCOUNTER — ANESTHESIA (OUTPATIENT)
Dept: SURGERY | Facility: HOSPITAL | Age: 65
End: 2020-02-25
Payer: COMMERCIAL

## 2020-02-25 ENCOUNTER — HOSPITAL ENCOUNTER (OUTPATIENT)
Facility: HOSPITAL | Age: 65
Setting detail: HOSPITAL OUTPATIENT SURGERY
Discharge: HOME | End: 2020-02-25
Attending: SURGERY | Admitting: SURGERY
Payer: COMMERCIAL

## 2020-02-25 VITALS
HEART RATE: 91 BPM | SYSTOLIC BLOOD PRESSURE: 134 MMHG | DIASTOLIC BLOOD PRESSURE: 65 MMHG | TEMPERATURE: 97 F | WEIGHT: 147.5 LBS | OXYGEN SATURATION: 100 % | BODY MASS INDEX: 22.88 KG/M2 | RESPIRATION RATE: 18 BRPM | HEIGHT: 67.5 IN

## 2020-02-25 DIAGNOSIS — Z90.11 ABSENCE OF RIGHT BREAST: ICD-10-CM

## 2020-02-25 PROCEDURE — 0JD83ZZ EXTRACTION OF ABDOMEN SUBCUTANEOUS TISSUE AND FASCIA, PERCUTANEOUS APPROACH: ICD-10-PCS | Performed by: SURGERY

## 2020-02-25 PROCEDURE — 0H0U0ZZ ALTERATION OF LEFT BREAST, OPEN APPROACH: ICD-10-PCS | Performed by: SURGERY

## 2020-02-25 PROCEDURE — S0028 INJECTION, FAMOTIDINE, 20 MG: HCPCS | Performed by: NURSE ANESTHETIST, CERTIFIED REGISTERED

## 2020-02-25 PROCEDURE — 0HRT0JZ REPLACEMENT OF RIGHT BREAST WITH SYNTHETIC SUBSTITUTE, OPEN APPROACH: ICD-10-PCS | Performed by: SURGERY

## 2020-02-25 PROCEDURE — 0JDM3ZZ EXTRACTION OF LEFT UPPER LEG SUBCUTANEOUS TISSUE AND FASCIA, PERCUTANEOUS APPROACH: ICD-10-PCS | Performed by: SURGERY

## 2020-02-25 PROCEDURE — 88305 TISSUE EXAM BY PATHOLOGIST: CPT | Performed by: SURGERY

## 2020-02-25 PROCEDURE — 0H0U0JZ ALTERATION OF LEFT BREAST WITH SYNTHETIC SUBSTITUTE, OPEN APPROACH: ICD-10-PCS | Performed by: SURGERY

## 2020-02-25 PROCEDURE — 0JDL3ZZ EXTRACTION OF RIGHT UPPER LEG SUBCUTANEOUS TISSUE AND FASCIA, PERCUTANEOUS APPROACH: ICD-10-PCS | Performed by: SURGERY

## 2020-02-25 PROCEDURE — 88304 TISSUE EXAM BY PATHOLOGIST: CPT | Performed by: SURGERY

## 2020-02-25 PROCEDURE — 0HB7XZZ EXCISION OF ABDOMEN SKIN, EXTERNAL APPROACH: ICD-10-PCS | Performed by: SURGERY

## 2020-02-25 PROCEDURE — 0HRT37Z REPLACEMENT OF RIGHT BREAST WITH AUTOLOGOUS TISSUE SUBSTITUTE, PERCUTANEOUS APPROACH: ICD-10-PCS | Performed by: SURGERY

## 2020-02-25 PROCEDURE — 0HPT0NZ REMOVAL OF TISSUE EXPANDER FROM RIGHT BREAST, OPEN APPROACH: ICD-10-PCS | Performed by: SURGERY

## 2020-02-25 RX ORDER — NALOXONE HYDROCHLORIDE 0.4 MG/ML
80 INJECTION, SOLUTION INTRAMUSCULAR; INTRAVENOUS; SUBCUTANEOUS AS NEEDED
Status: DISCONTINUED | OUTPATIENT
Start: 2020-02-25 | End: 2020-02-25

## 2020-02-25 RX ORDER — ONDANSETRON 2 MG/ML
4 INJECTION INTRAMUSCULAR; INTRAVENOUS AS NEEDED
Status: DISCONTINUED | OUTPATIENT
Start: 2020-02-25 | End: 2020-02-25

## 2020-02-25 RX ORDER — ONDANSETRON 4 MG/1
4 TABLET, ORALLY DISINTEGRATING ORAL EVERY 8 HOURS PRN
Qty: 20 TABLET | Refills: 1 | Status: SHIPPED | OUTPATIENT
Start: 2020-02-25 | End: 2020-06-17 | Stop reason: ALTCHOICE

## 2020-02-25 RX ORDER — HYDROCODONE BITARTRATE AND ACETAMINOPHEN 5; 325 MG/1; MG/1
TABLET ORAL
Qty: 40 TABLET | Refills: 0 | Status: SHIPPED | OUTPATIENT
Start: 2020-02-25 | End: 2020-06-17 | Stop reason: ALTCHOICE

## 2020-02-25 RX ORDER — LABETALOL HYDROCHLORIDE 5 MG/ML
5 INJECTION, SOLUTION INTRAVENOUS EVERY 5 MIN PRN
Status: DISCONTINUED | OUTPATIENT
Start: 2020-02-25 | End: 2020-02-25

## 2020-02-25 RX ORDER — CLINDAMYCIN HYDROCHLORIDE 300 MG/1
300 CAPSULE ORAL 3 TIMES DAILY
Qty: 21 CAPSULE | Refills: 0 | Status: SHIPPED | OUTPATIENT
Start: 2020-02-25 | End: 2020-03-03

## 2020-02-25 RX ORDER — GLYCOPYRROLATE 0.2 MG/ML
INJECTION, SOLUTION INTRAMUSCULAR; INTRAVENOUS AS NEEDED
Status: DISCONTINUED | OUTPATIENT
Start: 2020-02-25 | End: 2020-02-25 | Stop reason: SURG

## 2020-02-25 RX ORDER — ROCURONIUM BROMIDE 10 MG/ML
INJECTION, SOLUTION INTRAVENOUS AS NEEDED
Status: DISCONTINUED | OUTPATIENT
Start: 2020-02-25 | End: 2020-02-25 | Stop reason: SURG

## 2020-02-25 RX ORDER — EPHEDRINE SULFATE 50 MG/ML
INJECTION, SOLUTION INTRAVENOUS AS NEEDED
Status: DISCONTINUED | OUTPATIENT
Start: 2020-02-25 | End: 2020-02-25 | Stop reason: SURG

## 2020-02-25 RX ORDER — ACETAMINOPHEN 500 MG
1000 TABLET ORAL ONCE
Status: DISCONTINUED | OUTPATIENT
Start: 2020-02-25 | End: 2020-02-25 | Stop reason: HOSPADM

## 2020-02-25 RX ORDER — DOCUSATE SODIUM 100 MG/1
100 CAPSULE, LIQUID FILLED ORAL 2 TIMES DAILY PRN
Qty: 20 CAPSULE | Refills: 0 | Status: SHIPPED | OUTPATIENT
Start: 2020-02-25 | End: 2020-03-11

## 2020-02-25 RX ORDER — MIDAZOLAM HYDROCHLORIDE 1 MG/ML
1 INJECTION INTRAMUSCULAR; INTRAVENOUS EVERY 5 MIN PRN
Status: DISCONTINUED | OUTPATIENT
Start: 2020-02-25 | End: 2020-02-25

## 2020-02-25 RX ORDER — MEPERIDINE HYDROCHLORIDE 25 MG/ML
12.5 INJECTION INTRAMUSCULAR; INTRAVENOUS; SUBCUTANEOUS AS NEEDED
Status: DISCONTINUED | OUTPATIENT
Start: 2020-02-25 | End: 2020-02-25

## 2020-02-25 RX ORDER — ONDANSETRON 2 MG/ML
INJECTION INTRAMUSCULAR; INTRAVENOUS AS NEEDED
Status: DISCONTINUED | OUTPATIENT
Start: 2020-02-25 | End: 2020-02-25 | Stop reason: SURG

## 2020-02-25 RX ORDER — LIDOCAINE HYDROCHLORIDE 10 MG/ML
INJECTION, SOLUTION EPIDURAL; INFILTRATION; INTRACAUDAL; PERINEURAL AS NEEDED
Status: DISCONTINUED | OUTPATIENT
Start: 2020-02-25 | End: 2020-02-25 | Stop reason: SURG

## 2020-02-25 RX ORDER — HYDROMORPHONE HYDROCHLORIDE 1 MG/ML
0.4 INJECTION, SOLUTION INTRAMUSCULAR; INTRAVENOUS; SUBCUTANEOUS EVERY 5 MIN PRN
Status: DISCONTINUED | OUTPATIENT
Start: 2020-02-25 | End: 2020-02-25

## 2020-02-25 RX ORDER — SODIUM CHLORIDE, SODIUM LACTATE, POTASSIUM CHLORIDE, CALCIUM CHLORIDE 600; 310; 30; 20 MG/100ML; MG/100ML; MG/100ML; MG/100ML
INJECTION, SOLUTION INTRAVENOUS CONTINUOUS
Status: DISCONTINUED | OUTPATIENT
Start: 2020-02-25 | End: 2020-02-25

## 2020-02-25 RX ORDER — BUPIVACAINE HYDROCHLORIDE 5 MG/ML
INJECTION, SOLUTION EPIDURAL; INTRACAUDAL AS NEEDED
Status: DISCONTINUED | OUTPATIENT
Start: 2020-02-25 | End: 2020-02-25 | Stop reason: HOSPADM

## 2020-02-25 RX ORDER — HYDROCODONE BITARTRATE AND ACETAMINOPHEN 5; 325 MG/1; MG/1
1 TABLET ORAL AS NEEDED
Status: DISCONTINUED | OUTPATIENT
Start: 2020-02-25 | End: 2020-02-25

## 2020-02-25 RX ORDER — NEOSTIGMINE METHYLSULFATE 1 MG/ML
INJECTION INTRAVENOUS AS NEEDED
Status: DISCONTINUED | OUTPATIENT
Start: 2020-02-25 | End: 2020-02-25 | Stop reason: SURG

## 2020-02-25 RX ORDER — CLINDAMYCIN PHOSPHATE 900 MG/50ML
INJECTION INTRAVENOUS
Status: DISCONTINUED
Start: 2020-02-25 | End: 2020-02-25

## 2020-02-25 RX ORDER — HYDROCODONE BITARTRATE AND ACETAMINOPHEN 5; 325 MG/1; MG/1
2 TABLET ORAL AS NEEDED
Status: DISCONTINUED | OUTPATIENT
Start: 2020-02-25 | End: 2020-02-25

## 2020-02-25 RX ORDER — METOCLOPRAMIDE HYDROCHLORIDE 5 MG/ML
INJECTION INTRAMUSCULAR; INTRAVENOUS AS NEEDED
Status: DISCONTINUED | OUTPATIENT
Start: 2020-02-25 | End: 2020-02-25 | Stop reason: SURG

## 2020-02-25 RX ORDER — FAMOTIDINE 10 MG/ML
INJECTION, SOLUTION INTRAVENOUS AS NEEDED
Status: DISCONTINUED | OUTPATIENT
Start: 2020-02-25 | End: 2020-02-25 | Stop reason: SURG

## 2020-02-25 RX ORDER — ONDANSETRON 2 MG/ML
INJECTION INTRAMUSCULAR; INTRAVENOUS
Status: COMPLETED
Start: 2020-02-25 | End: 2020-02-25

## 2020-02-25 RX ORDER — ACETAMINOPHEN 500 MG
1000 TABLET ORAL ONCE
COMMUNITY

## 2020-02-25 RX ORDER — CLINDAMYCIN PHOSPHATE 900 MG/50ML
900 INJECTION INTRAVENOUS ONCE
Status: COMPLETED | OUTPATIENT
Start: 2020-02-25 | End: 2020-02-25

## 2020-02-25 RX ADMIN — LIDOCAINE HYDROCHLORIDE 50 MG: 10 INJECTION, SOLUTION EPIDURAL; INFILTRATION; INTRACAUDAL; PERINEURAL at 12:50:00

## 2020-02-25 RX ADMIN — GLYCOPYRROLATE 0.4 MG: 0.2 INJECTION, SOLUTION INTRAMUSCULAR; INTRAVENOUS at 16:32:00

## 2020-02-25 RX ADMIN — METOCLOPRAMIDE HYDROCHLORIDE 10 MG: 5 INJECTION INTRAMUSCULAR; INTRAVENOUS at 12:56:00

## 2020-02-25 RX ADMIN — CLINDAMYCIN PHOSPHATE 900 MG: 900 INJECTION INTRAVENOUS at 12:56:00

## 2020-02-25 RX ADMIN — ROCURONIUM BROMIDE 50 MG: 10 INJECTION, SOLUTION INTRAVENOUS at 12:50:00

## 2020-02-25 RX ADMIN — SODIUM CHLORIDE, SODIUM LACTATE, POTASSIUM CHLORIDE, CALCIUM CHLORIDE: 600; 310; 30; 20 INJECTION, SOLUTION INTRAVENOUS at 13:55:00

## 2020-02-25 RX ADMIN — EPHEDRINE SULFATE 10 MG: 50 INJECTION, SOLUTION INTRAVENOUS at 13:53:00

## 2020-02-25 RX ADMIN — NEOSTIGMINE METHYLSULFATE 3 MG: 1 INJECTION INTRAVENOUS at 16:32:00

## 2020-02-25 RX ADMIN — ROCURONIUM BROMIDE 10 MG: 10 INJECTION, SOLUTION INTRAVENOUS at 16:01:00

## 2020-02-25 RX ADMIN — SODIUM CHLORIDE, SODIUM LACTATE, POTASSIUM CHLORIDE, CALCIUM CHLORIDE: 600; 310; 30; 20 INJECTION, SOLUTION INTRAVENOUS at 15:34:00

## 2020-02-25 RX ADMIN — ROCURONIUM BROMIDE 20 MG: 10 INJECTION, SOLUTION INTRAVENOUS at 13:49:00

## 2020-02-25 RX ADMIN — FAMOTIDINE 20 MG: 10 INJECTION, SOLUTION INTRAVENOUS at 12:56:00

## 2020-02-25 RX ADMIN — EPHEDRINE SULFATE 10 MG: 50 INJECTION, SOLUTION INTRAVENOUS at 15:43:00

## 2020-02-25 RX ADMIN — ONDANSETRON 4 MG: 2 INJECTION INTRAMUSCULAR; INTRAVENOUS at 16:08:00

## 2020-02-25 NOTE — BRIEF OP NOTE
Pre-Operative Diagnosis: Absence of right breast [Z90.11]     Post-Operative Diagnosis: Absence of right breast [Z90.11]      Procedure Performed:   Procedure(s):  Right breast tissue expander removal, permanent silicone implant placement, left mastopexy a

## 2020-02-25 NOTE — ANESTHESIA PREPROCEDURE EVALUATION
PRE-OP EVALUATION    Patient Name: Lacretia Sleight    Pre-op Diagnosis: Absence of right breast [Z90.11]    Procedure(s):  Right tissue expander removal, permanent silicone implant placement, left mastopexy augmentation, autologous fat grafting to right Pulmonary    Negative pulmonary ROS. Neuro/Psych    Negative neuro/psych ROS.                           Patient Active Problem List:     Breast cancer Rt Ductal     Traumatic closed fracture of shaft of clavicl Airway      Mallampati: II  Mouth opening: >3 FB  TM distance: > 6 cm  Neck ROM: full Cardiovascular    Cardiovascular exam normal.         Dental             Pulmonary    Pulmonary exam normal.                 Other findings            ASA: 2

## 2020-02-25 NOTE — ANESTHESIA POSTPROCEDURE EVALUATION
[address] Patient Status:  Hospital Outpatient Surgery   Age/Gender 62year old female MRN [id number]   [facility] SURGERY Attending Slava Poole., MD   Hosp Day # 0 PCP Tung Farley MD       Anesthesia Post-op

## 2020-02-25 NOTE — ANESTHESIA PROCEDURE NOTES
Airway  Urgency: elective      General Information and Staff    Patient location during procedure: OR  Anesthesiologist: Kerry Noland MD  Resident/CRNA: Homero Martinez CRNA  Performed: CRNA     Indications and Patient Condition  Indications for airway man

## 2020-02-26 ENCOUNTER — TELEPHONE (OUTPATIENT)
Dept: SURGERY | Facility: CLINIC | Age: 65
End: 2020-02-26

## 2020-02-26 NOTE — TELEPHONE ENCOUNTER
Called patient back after receiving a voicemail that she left with dressing questions. She wanted to know when to take off the gauze dressing. I informed her that the dressing can come off 48 hours post op and the compression devices should be reapplied.  Dedrick Davis

## 2020-02-26 NOTE — OPERATIVE REPORT
[facility]    PATIENT'S NAME: ANGELICA VERMA   ATTENDING PHYSICIAN: Agnes Gutiérrez MD   OPERATING PHYSICIAN: Agnes Gutiérrez MD   PATIENT ACCOUNT#:   [id number]    LOCATION:  [address]  MEDICAL RECORD #:   [id number]       DATE The potential risks, complications, benefits, and alternatives were in detail discussed.   Risks and complications included, but were not limited to, infection, bleeding, scarring, damage to surrounding structures, hematoma, seroma, asymmetry, nipple necros The scar contracture was released and the firm capsular nodule of AlloDerm was excised. This was sent to Pathology separately. Then, several sizers were placed. The 415 mL high-profile extra sizer appeared to have the best shape.   Then, the autologous augmentation mastopexy was used. The pectoralis major muscle was partially undermined for the dual plane implant position. Then, several sizers were placed and the 190 mL moderate plus profile sizer appeared to give the best symmetry.   This was then foll abdominal binder were applied to the abdomen. The patient tolerated the procedure well and awoke from anesthesia without difficulty. All sponge, instrument, and needle counts were correct at the end of the case. Dictated By Jaja Johnosn.  Berenice Mathur MD  d: 02/25

## 2020-02-28 ENCOUNTER — TELEPHONE (OUTPATIENT)
Dept: SURGERY | Facility: CLINIC | Age: 65
End: 2020-02-28

## 2020-02-28 NOTE — TELEPHONE ENCOUNTER
I spoke with the patient who called regarding pathology results for her soft tissue mass removal during surgery with Dr. Jerman Arechiga recently-  I advised that pathology on this specimen is still in process and to feel free to call us next week regarding results

## 2020-03-11 ENCOUNTER — OFFICE VISIT (OUTPATIENT)
Dept: SURGERY | Facility: CLINIC | Age: 65
End: 2020-03-11

## 2020-03-11 DIAGNOSIS — Z90.11 ABSENCE OF RIGHT BREAST: Primary | ICD-10-CM

## 2020-03-11 PROCEDURE — 99024 POSTOP FOLLOW-UP VISIT: CPT | Performed by: SURGERY

## 2020-03-11 NOTE — PROGRESS NOTES
Tevin Okeefe is a 60year old female who presents today for a follow-up after R TE removal and implant placement and L aug-mastopexy      She denies fever and chills. She denies nausea, vomiting, diarrhea or constipation. Her pain is controlled.

## 2020-05-15 ENCOUNTER — TELEPHONE (OUTPATIENT)
Dept: SURGERY | Facility: CLINIC | Age: 65
End: 2020-05-15

## 2020-05-15 DIAGNOSIS — Z17.0 MALIGNANT NEOPLASM OF CENTRAL PORTION OF RIGHT BREAST IN FEMALE, ESTROGEN RECEPTOR POSITIVE (HCC): ICD-10-CM

## 2020-05-15 DIAGNOSIS — C50.111 MALIGNANT NEOPLASM OF CENTRAL PORTION OF RIGHT BREAST IN FEMALE, ESTROGEN RECEPTOR POSITIVE (HCC): ICD-10-CM

## 2020-05-15 DIAGNOSIS — Z90.11 ABSENCE OF RIGHT BREAST: Primary | ICD-10-CM

## 2020-06-17 ENCOUNTER — OFFICE VISIT (OUTPATIENT)
Dept: SURGERY | Facility: CLINIC | Age: 65
End: 2020-06-17

## 2020-06-17 DIAGNOSIS — Z90.11 ABSENCE OF RIGHT BREAST: Primary | ICD-10-CM

## 2020-06-17 PROCEDURE — 99213 OFFICE O/P EST LOW 20 MIN: CPT | Performed by: SURGERY

## 2020-06-17 NOTE — PROGRESS NOTES
Estabilshed Patient Consultation    Chief Complaint: absence of right breast    History of Present Illness:   Myles Silvestre is a 60year old female who returns to the office s/p R TE removal and implant placement and L aug-mastopexy on 2/25/2020.  She i Rfl:   Ibuprofen (ADVIL OR), Take  by mouth as needed. , Disp: , Rfl:           Allergies:      Citalopram              OTHER (SEE COMMENTS)    Comment:Burning, nausea, headache with one dose  Dexamethasone           PALPITATIONS    Comment:Racing and distu Cardiovascular: no cyanosis, no edema    Musculoskeletal: Extremities unremarkable, without edema, tenderness or deformities      Impression:   Hemanth Pritchett  is a 60year old s/p R TE removal and implant placement and L aug-mastopexy on 2/25/2020

## 2023-10-30 NOTE — TELEPHONE ENCOUNTER
Called pharmacy and canceled Vyvanse 20 mg and 40 mg, pended Brand Vyvanse 60 mg (verified this is available with pharmacy) pended for PCP review. Thanks.    Patient is scheduled to see Dr. Milly Aguilar. Patient has HMO insurance, please enter in a referral in the system for the visit.      Thanks

## (undated) DEVICE — SUTURE VICRYL 4-0 RB-1

## (undated) DEVICE — PLASTIC BREAST CDS-LF: Brand: MEDLINE INDUSTRIES, INC.

## (undated) DEVICE — Device

## (undated) DEVICE — BLADE ELECTRODE: Brand: EDGE

## (undated) DEVICE — CAUTERY BLADE 2IN INS E1455

## (undated) DEVICE — SYRINGE 10ML LL CONTRL SYRINGE

## (undated) DEVICE — SUTURE ETHILON 3-0 PS-1

## (undated) DEVICE — LIGACLIP MCA MULTIPLE CLIP APPLIERS, 30 MEDIUM CLIPS: Brand: LIGACLIP

## (undated) DEVICE — KENDALL SCD EXPRESS SLEEVES, KNEE LENGTH, MEDIUM: Brand: KENDALL SCD

## (undated) DEVICE — DRAIN RELIAVAC 100CC

## (undated) DEVICE — BLADE ELECTROSURG 4IN INSULATE

## (undated) DEVICE — HEMOCLIP HORIZON SM 001200

## (undated) DEVICE — SOL  .9 1000ML BTL

## (undated) DEVICE — 3M™ IOBAN™ 2 ANTIMICROBIAL INCISE DRAPE 6648EZ: Brand: IOBAN™ 2

## (undated) DEVICE — UNDERPAD INCNT 30X30IN PP HVY

## (undated) DEVICE — SYRINGE 3ML LL TIP

## (undated) DEVICE — 40580 - THE PINK PAD - ADVANCED TRENDELENBURG POSITIONING KIT: Brand: 40580 - THE PINK PAD - ADVANCED TRENDELENBURG POSITIONING KIT

## (undated) DEVICE — GAMMEX® PI HYBRID SIZE 6.5, STERILE POWDER-FREE SURGICAL GLOVE, POLYISOPRENE AND NEOPRENE BLEND: Brand: GAMMEX

## (undated) DEVICE — 3M™ STERI-STRIP™ REINFORCED ADHESIVE SKIN CLOSURES, R1548, 1 IN X 5 IN (25 MM X 125 MM), 4 STRIPS/ENVELOPE: Brand: 3M™ STERI-STRIP™

## (undated) DEVICE — SUTURE SILK 0 FSL

## (undated) DEVICE — SUTURE VICRYL 0 CT-1

## (undated) DEVICE — SUTURE VICRYL 3-0 SH

## (undated) DEVICE — SUTURE MONOCRYL 4-0 PS-2

## (undated) DEVICE — LAPAROTOMY SPONGE - RF AND X-RAY DETECTABLE PRE-WASHED: Brand: SITUATE

## (undated) DEVICE — 3M™ STERI-STRIP™ REINFORCED ADHESIVE SKIN CLOSURES, R1547, 1/2 IN X 4 IN (12 MM X 100 MM), 6 STRIPS/ENVELOPE: Brand: 3M™ STERI-STRIP™

## (undated) DEVICE — LAMINECTOMY ARM CRADLE FOAM POSITIONER: Brand: CARDINAL HEALTH

## (undated) DEVICE — CHLORAPREP ORANGE TINT 10.5ML

## (undated) DEVICE — GOWN,SIRUS,FABRIC-REINFORCED,X-LARGE: Brand: MEDLINE

## (undated) DEVICE — 1/2 ML INSULIN SAFETY SYRINGE,PERMANENT NEEDLE: Brand: MAGELLAN

## (undated) DEVICE — SYRINGE 50ML LL TIP

## (undated) DEVICE — UNDYED BRAIDED (POLYGLACTIN 910), SYNTHETIC ABSORBABLE SUTURE: Brand: COATED VICRYL

## (undated) DEVICE — GAMMEX® PI HYBRID SIZE 6, STERILE POWDER-FREE SURGICAL GLOVE, POLYISOPRENE AND NEOPRENE BLEND: Brand: GAMMEX

## (undated) DEVICE — DEVICE FAT TISSUE COLL REVOLVE

## (undated) DEVICE — PLASTC TOOMEY SYRNG DISP

## (undated) DEVICE — DRAIN ROUND HUBLESS 15FR

## (undated) DEVICE — CSTM UNIVERSAL DRAPE PK: Brand: MEDLINE INDUSTRIES, INC.

## (undated) DEVICE — 1010 S-DRAPE TOWEL DRAPE 10/BX: Brand: STERI-DRAPE™

## (undated) DEVICE — SYRINGE 10ML LL TIP

## (undated) DEVICE — MODERATE PLUS PROFILE, M+ 175CC GEL SIZER: Brand: MENTOR MEMORYGEL RESTERILIZABLE GEL SIZER

## (undated) DEVICE — STERILE HOOK LOCK LATEX FREE ELASTIC BANDAGE 6INX5YD: Brand: HOOK LOCK™

## (undated) DEVICE — CASED DISP BIPOLAR CORD

## (undated) DEVICE — 3M(TM) TEGADERM(TM) TRANSPARENT FILM DRESSING FRAME STYLE 9505W: Brand: 3M™ TEGADERM™

## (undated) DEVICE — SUPER SPONGES,MEDIUM: Brand: KERLIX

## (undated) DEVICE — GAMMEX® PI HYBRID SIZE 7, STERILE POWDER-FREE SURGICAL GLOVE, POLYISOPRENE AND NEOPRENE BLEND: Brand: GAMMEX

## (undated) DEVICE — PROXIMATE SKIN STAPLERS (35 WIDE) CONTAINS 35 STAINLESS STEEL STAPLES (FIXED HEAD): Brand: PROXIMATE

## (undated) DEVICE — 3M™ IOBAN™ 2 ANTIMICROBIAL INCISE DRAPE 6650EZ: Brand: IOBAN™ 2

## (undated) DEVICE — HEMOCLIP HORIZON MED 002200

## (undated) DEVICE — SOLUTION SET, MALE LUER LOCK ADAPTER

## (undated) DEVICE — SOL LACT RINGERS 3000ML

## (undated) DEVICE — PROXIMATE RH ROTATING HEAD SKIN STAPLERS (35 WIDE) CONTAINS 35 STAINLESS STEEL STAPLES: Brand: PROXIMATE

## (undated) DEVICE — DRAPE HALF 40X58 DYNJP2410

## (undated) DEVICE — PSI-TEC TUBING: Brand: PSI-TEC TUBING

## (undated) DEVICE — NEEDLE BUTTERFLY 21GX1-3/4

## (undated) DEVICE — MODERATE PLUS PROFILE, M+ 200CC GEL SIZER: Brand: MENTOR MEMORYGEL RESTERILIZABLE GEL SIZER

## (undated) DEVICE — SUTURE SILK 0

## (undated) DEVICE — MODERATE PLUS PROFILE XTRA, M+X 190CC GEL SIZER: Brand: MENTOR MEMORYGEL XTRA RESTERILIZABLE GEL SIZER

## (undated) DEVICE — REDUCER FITTING (NON-STERILE) 7/8 IN (22 MM) TO 1/4 IN (6.4 MM): Brand: CONMED BUFFALO FILTER

## (undated) DEVICE — STANDARD HYPODERMIC NEEDLE,POLYPROPYLENE HUB: Brand: MONOJECT

## (undated) DEVICE — MARKER SKIN PREP RESIST STRL

## (undated) DEVICE — DERMABOND LIQUID ADHESIVE

## (undated) DEVICE — SUTURE PDS II 2-0 SH

## (undated) DEVICE — HIGH PROFILE XTRA, GEL SIZER FOR USE WITH SHPX-355: Brand: MENTOR MEMORYGEL XTRA RESTERILIZABLE GEL SIZER

## (undated) DEVICE — TOWEL OR BLU 16X26 STRL

## (undated) DEVICE — SUTURE PLAIN GUT 5-0 PC-1

## (undated) DEVICE — PAD SACRAL SPAN AID

## (undated) DEVICE — BRA SURGICAL ELIZABETH PINK XL

## (undated) DEVICE — VIOLET BRAIDED (POLYGLACTIN 910), SYNTHETIC ABSORBABLE SUTURE: Brand: COATED VICRYL

## (undated) DEVICE — REDUCER FITTING (NON-STERILE) 7/8 IN (22MM) TO 1/4 IN (6.4MM) WITH 2 IN (5.1CM) CUFF

## (undated) DEVICE — STOPCOCK IV 4 WAY BD

## (undated) DEVICE — STERILE (15.2 X 244CM) POLYETHYLENE TELESCOPICALLY-FOLDED COVER WITH ATTACHED (3CM) NEOGUARD™ TIP: Brand: SURGI-TIP TRANSDUCER COVER

## (undated) DEVICE — BULB SYRINGE,IRRIGATION WITH PROTECTIVE CAP: Brand: DOVER

## (undated) DEVICE — MODERATE PLUS PROFILE XTRA, M+X 405CC GEL SIZER: Brand: MENTOR MEMORYGEL XTRA RESTERILIZABLE GEL SIZER

## (undated) DEVICE — CG INFILTRATION TUBING: Brand: CG INFILTRATION TUBING

## (undated) DEVICE — BRA SURGICAL ELIZABETH PINK L

## (undated) DEVICE — BANDAGE ROLL,100% COTTON, 6 PLY, LARGE: Brand: KERLIX

## (undated) DEVICE — DRESSING FOAM TOPIFOAM

## (undated) DEVICE — 60 ML SYRINGE LUER-LOCK TIP: Brand: MONOJECT

## (undated) DEVICE — HIGH PROFILE XTRA, GEL SIZER FOR USE WITH SHPX-200: Brand: MENTOR MEMORYGEL XTRA RESTERILIZABLE GEL SIZER

## (undated) NOTE — LETTER
[facility]  [address] [facility] [facility]  [place] [address]  [address]: [phone]  FAX: [phone] Addison Wade MD  [facility] [address]: [phone]    Vijay rubio